# Patient Record
Sex: FEMALE | Race: WHITE | NOT HISPANIC OR LATINO | Employment: OTHER | URBAN - METROPOLITAN AREA
[De-identification: names, ages, dates, MRNs, and addresses within clinical notes are randomized per-mention and may not be internally consistent; named-entity substitution may affect disease eponyms.]

---

## 2018-08-09 ENCOUNTER — TRANSCRIBE ORDERS (OUTPATIENT)
Dept: ADMINISTRATIVE | Facility: HOSPITAL | Age: 63
End: 2018-08-09

## 2018-08-09 ENCOUNTER — HOSPITAL ENCOUNTER (OUTPATIENT)
Dept: CT IMAGING | Facility: HOSPITAL | Age: 63
Discharge: HOME | End: 2018-08-09
Admitting: INTERNAL MEDICINE

## 2018-08-09 DIAGNOSIS — C18.0 MALIGNANT NEOPLASM OF CECUM (HCC): ICD-10-CM

## 2018-08-09 DIAGNOSIS — C18.0 MALIGNANT NEOPLASM OF CECUM (HCC): Primary | ICD-10-CM

## 2018-08-09 PROCEDURE — 0 DIATRIZOATE MEGLUMINE & SODIUM PER 1 ML: Performed by: INTERNAL MEDICINE

## 2018-08-09 PROCEDURE — 71260 CT THORAX DX C+: CPT

## 2018-08-09 PROCEDURE — 74177 CT ABD & PELVIS W/CONTRAST: CPT

## 2018-08-09 PROCEDURE — 82565 ASSAY OF CREATININE: CPT

## 2018-08-09 PROCEDURE — 25010000002 IOPAMIDOL 61 % SOLUTION: Performed by: INTERNAL MEDICINE

## 2018-08-09 RX ADMIN — Medication 15 ML: at 17:45

## 2018-08-09 RX ADMIN — IOPAMIDOL 95 ML: 612 INJECTION, SOLUTION INTRAVENOUS at 19:19

## 2018-08-10 LAB — CREAT BLDA-MCNC: 1 MG/DL (ref 0.6–1.3)

## 2019-11-14 ENCOUNTER — OFFICE VISIT (OUTPATIENT)
Dept: ENDOCRINOLOGY | Facility: CLINIC | Age: 64
End: 2019-11-14

## 2019-11-14 VITALS
DIASTOLIC BLOOD PRESSURE: 84 MMHG | HEART RATE: 77 BPM | WEIGHT: 202.8 LBS | SYSTOLIC BLOOD PRESSURE: 142 MMHG | HEIGHT: 62 IN | BODY MASS INDEX: 37.32 KG/M2 | OXYGEN SATURATION: 99 %

## 2019-11-14 DIAGNOSIS — R53.82 CHRONIC FATIGUE: ICD-10-CM

## 2019-11-14 DIAGNOSIS — E66.09 CLASS 2 OBESITY DUE TO EXCESS CALORIES WITHOUT SERIOUS COMORBIDITY WITH BODY MASS INDEX (BMI) OF 37.0 TO 37.9 IN ADULT: ICD-10-CM

## 2019-11-14 DIAGNOSIS — Z86.39 HISTORY OF HYPERPARATHYROIDISM: Primary | ICD-10-CM

## 2019-11-14 PROBLEM — F32.A DEPRESSION: Status: ACTIVE | Noted: 2019-11-14

## 2019-11-14 PROBLEM — E78.2 MIXED HYPERLIPIDEMIA: Status: ACTIVE | Noted: 2019-11-14

## 2019-11-14 PROCEDURE — 99204 OFFICE O/P NEW MOD 45 MIN: CPT | Performed by: INTERNAL MEDICINE

## 2019-11-14 RX ORDER — PRAVASTATIN SODIUM 40 MG
TABLET ORAL
COMMUNITY
Start: 2019-10-03

## 2019-11-14 RX ORDER — CETIRIZINE HYDROCHLORIDE 10 MG/1
10 TABLET ORAL DAILY
COMMUNITY

## 2019-11-14 RX ORDER — SERTRALINE HYDROCHLORIDE 100 MG/1
TABLET, FILM COATED ORAL
COMMUNITY
Start: 2019-10-03

## 2019-11-14 RX ORDER — ERGOCALCIFEROL (VITAMIN D2) 10 MCG
400 TABLET ORAL DAILY
COMMUNITY

## 2019-11-14 RX ORDER — QUETIAPINE FUMARATE 25 MG/1
TABLET, FILM COATED ORAL
COMMUNITY
Start: 2019-11-04

## 2019-11-14 RX ORDER — PRAMIPEXOLE DIHYDROCHLORIDE 0.25 MG/1
TABLET ORAL
COMMUNITY
Start: 2019-11-09

## 2019-11-14 NOTE — PROGRESS NOTES
"Chief Complaint   Patient presents with   • Thyroid Problem     New patient - hyperparathyroid         Referring Provider  Stella Sánchez APRN HPI   Sophie Keller is a 64 y.o. female had concerns including Thyroid Problem (New patient - hyperparathyroid ).   She was told about 10 years ago she had hyperparathyroidism. This evaluation was started when her sister needed a kidney donor and she was a match.   Pt has a history of kidney stones, ultimately she couldn't donate the kidney.   She still has kidney stones at times. Sees a urologist - Dr. Frye. Many of the females in her family have issues with kidney stones.   Pt complains of weight fluctuations and fatigue so wonders if she has a thyroid issue. Complains of facial hotness and hot flashes.   Has gained over the last year - doesn't weigh.   States she \"doesn't eat good\". Eats fried pork chops, potatoes, drinks unsweet tea. Eats sandwiches.  Labs from 19 show normal TSH and free T4, normal total calcium and PTH level.     Pt sleeps poorly. She can't fall asleep and has difficulty falling asleep. Sometimes won't sleep for >24 hrs.   She sees Dr. Marie for sleep. Was told she may have sleep apnea.     Past Medical History:   Diagnosis Date   • Colon cancer (CMS/HCC)    • History of hyperparathyroidism 2019   • Hyperlipidemia    • Hyperparathyroidism (CMS/HCC)      Past Surgical History:   Procedure Laterality Date   • APPENDECTOMY     •  SECTION     • CHOLECYSTECTOMY     • COLON SURGERY     • COLONOSCOPY     • ENDOSCOPY     • KIDNEY STONE SURGERY     • KNEE SURGERY     • REPLACEMENT TOTAL KNEE        Family History   Problem Relation Age of Onset   • Heart disease Mother    • Heart disease Father    • Hypertension Sister    • Kidney disease Sister    • Hypertension Brother    • Heart disease Brother       Social History     Socioeconomic History   • Marital status:      Spouse name: Not on file   • Number of children: Not on " "file   • Years of education: Not on file   • Highest education level: Not on file   Tobacco Use   • Smoking status: Never Smoker   • Smokeless tobacco: Never Used   Substance and Sexual Activity   • Alcohol use: No     Frequency: Never   • Drug use: No   • Sexual activity: Defer      No Known Allergies   Current Outpatient Medications on File Prior to Visit   Medication Sig Dispense Refill   • cetirizine (zyrTEC) 10 MG tablet Take 10 mg by mouth Daily.     • pramipexole (MIRAPEX) 0.25 MG tablet      • pravastatin (PRAVACHOL) 40 MG tablet      • QUEtiapine (SEROquel) 25 MG tablet      • sertraline (ZOLOFT) 100 MG tablet      • Vitamin D, Cholecalciferol, (CHOLECALCIFEROL) 10 MCG (400 UNIT) tablet Take 400 Units by mouth Daily.       No current facility-administered medications on file prior to visit.         Review of Systems   Constitutional: Positive for fatigue and unexpected weight gain.   HENT: Positive for congestion, postnasal drip and sore throat.    Eyes: Positive for redness and itching.   Respiratory: Positive for cough. Negative for shortness of breath.    Cardiovascular: Negative.    Gastrointestinal: Positive for diarrhea and nausea. Negative for abdominal pain.   Endocrine: Positive for heat intolerance. Negative for cold intolerance.   Genitourinary: Negative.    Musculoskeletal: Positive for back pain, gait problem and myalgias.   Skin: Negative.    Allergic/Immunologic: Negative.    Neurological: Positive for dizziness and light-headedness.   Hematological: Negative.    Psychiatric/Behavioral: Positive for depressed mood. The patient is nervous/anxious.       ROS was reviewed and verified. All other ROS negative.    /84 (BP Location: Left arm, Patient Position: Sitting, Cuff Size: Adult)   Pulse 77   Ht 157.5 cm (62\")   Wt 92 kg (202 lb 12.8 oz)   SpO2 99%   Breastfeeding? No   BMI 37.09 kg/m²      Physical Exam    Constitutional:  well developed; well nourished  no acute distress "   ENT/Thyroid: no thyromegaly  no palpable nodules   Eyes: EOM intact  Conjunctiva: clear   Respiratory:  breathing is unlabored  clear to auscultation bilaterally   Cardiovascular:  regular rate and rhythm, S1, S2 normal, no murmur, click, rub or gallop   Chest:  Not performed.   Abdomen: Not performed.   : Not performed.   Musculoskeletal: negative findings:  ROM of all joints is normal, no deformities present   Skin: dry and warm   Neuro: normal without focal findings, mental status, speech normal, alert and oriented x3 and IRAM   Psych: oriented to time, place and person, mood and affect are within normal limits     Labs/Imaging  9/11/2019 TSH normal, free T4 normal, CBC normal, CMP with calcium 9.6, albumin 4.5, , parathyroid hormone level 55, calcium on the PTH level 10.2    Assessment and Plan    Sophie was seen today for thyroid problem.    Diagnoses and all orders for this visit:    History of hyperparathyroidism  Patient has a reported history of hyperparathyroidism, however, has normal PTH and calcium levels on most recent labs.  It is unclear how this diagnosis was made originally.    She does have a history of nephrolithiasis and is following with urology, but normal calcium and PTH levels rules out hyperparathyroidism.  Recommend yearly monitoring of calcium level.  Routine parathyroid hormone level testing is not necessary unless she has an elevated calcium level.    Class 2 obesity due to excess calories without serious comorbidity with body mass index (BMI) of 37.0 to 37.9 in adult  BMI 37.1.  Patient has a diet high in carbohydrates and high fat.  Decreased calorie intake.  Increase exercise activity.  Hypothyroidism ruled out with normal TFTs.    Chronic fatigue  Thyroid function is normal most recent blood work, ruling out hypothyroidism.  This can be monitored yearly.  She has suspected untreated sleep apnea/insomnia.  Follow-up with PCP and sleep specialist for additional  treatment.    Return if symptoms worsen or fail to improve. The patient was instructed to contact the clinic with any interval questions or concerns.    Aubree Gauthier, DO   Endocrinologist    Please note that portions of this document were completed with a voice recognition program. Efforts were made to edit the dictations, but occasionally words are mis-transcribed.